# Patient Record
Sex: MALE | Race: WHITE | NOT HISPANIC OR LATINO | Employment: OTHER | URBAN - METROPOLITAN AREA
[De-identification: names, ages, dates, MRNs, and addresses within clinical notes are randomized per-mention and may not be internally consistent; named-entity substitution may affect disease eponyms.]

---

## 2021-08-16 ENCOUNTER — IMMUNIZATION (OUTPATIENT)
Dept: FAMILY MEDICINE | Facility: CLINIC | Age: 71
End: 2021-08-16
Payer: MEDICARE

## 2021-08-16 DIAGNOSIS — Z23 NEED FOR VACCINATION: Primary | ICD-10-CM

## 2021-08-16 PROCEDURE — 91301 COVID-19, MRNA, LNP-S, PF, 100 MCG/0.5 ML DOSE VACCINE: ICD-10-PCS | Mod: S$GLB,,, | Performed by: FAMILY MEDICINE

## 2021-08-16 PROCEDURE — 0013A COVID-19, MRNA, LNP-S, PF, 100 MCG/0.5 ML DOSE VACCINE: ICD-10-PCS | Mod: CV19,S$GLB,, | Performed by: FAMILY MEDICINE

## 2021-08-16 PROCEDURE — 91301 COVID-19, MRNA, LNP-S, PF, 100 MCG/0.5 ML DOSE VACCINE: CPT | Mod: S$GLB,,, | Performed by: FAMILY MEDICINE

## 2021-08-16 PROCEDURE — 0013A COVID-19, MRNA, LNP-S, PF, 100 MCG/0.5 ML DOSE VACCINE: CPT | Mod: CV19,S$GLB,, | Performed by: FAMILY MEDICINE

## 2022-07-25 ENCOUNTER — IMMUNIZATION (OUTPATIENT)
Dept: FAMILY MEDICINE | Facility: CLINIC | Age: 72
End: 2022-07-25
Payer: MEDICARE

## 2022-07-25 DIAGNOSIS — Z23 NEED FOR VACCINATION: Primary | ICD-10-CM

## 2022-07-25 PROCEDURE — 91306 COVID-19, MRNA, LNP-S, PF, 100 MCG/0.25 ML DOSE VACCINE (MODERNA BOOSTER): CPT | Mod: PBBFAC

## 2022-07-25 PROCEDURE — 0064A COVID-19, MRNA, LNP-S, PF, 100 MCG/0.25 ML DOSE VACCINE (MODERNA BOOSTER): CPT | Mod: PBBFAC

## 2022-07-25 NOTE — PROGRESS NOTES
Ricardo Jaime III arrive to clinic awake and alert.  Pt verified name and .   Allergies reviewed with patient.  Pt given MODERNA via Intramuscular Left deltoid per provider orders.    Well tolerated by patient.  denies further needs.    Patient instructed to wait 15 mins after injection.   Patient states no vaccines in the last 14 days.  Vaccine information sheet was given to patient. Patient voiced understanding.    Amara Donovan LPN

## 2023-07-05 ENCOUNTER — OFFICE VISIT (OUTPATIENT)
Dept: OTOLARYNGOLOGY | Facility: CLINIC | Age: 73
End: 2023-07-05
Payer: MEDICARE

## 2023-07-05 VITALS — DIASTOLIC BLOOD PRESSURE: 75 MMHG | WEIGHT: 288.81 LBS | SYSTOLIC BLOOD PRESSURE: 139 MMHG

## 2023-07-05 DIAGNOSIS — C91.10 CLL (CHRONIC LYMPHOCYTIC LEUKEMIA): ICD-10-CM

## 2023-07-05 DIAGNOSIS — J32.9 CHRONIC SINUSITIS, UNSPECIFIED LOCATION: Primary | ICD-10-CM

## 2023-07-05 PROCEDURE — 99999 PR PBB SHADOW E&M-EST. PATIENT-LVL III: CPT | Mod: PBBFAC,,, | Performed by: OTOLARYNGOLOGY

## 2023-07-05 PROCEDURE — 99213 OFFICE O/P EST LOW 20 MIN: CPT | Mod: PBBFAC,PN | Performed by: OTOLARYNGOLOGY

## 2023-07-05 PROCEDURE — 99999 PR PBB SHADOW E&M-EST. PATIENT-LVL III: ICD-10-PCS | Mod: PBBFAC,,, | Performed by: OTOLARYNGOLOGY

## 2023-07-05 PROCEDURE — 99203 PR OFFICE/OUTPT VISIT, NEW, LEVL III, 30-44 MIN: ICD-10-PCS | Mod: S$PBB,,, | Performed by: OTOLARYNGOLOGY

## 2023-07-05 PROCEDURE — 87070 CULTURE OTHR SPECIMN AEROBIC: CPT | Performed by: OTOLARYNGOLOGY

## 2023-07-05 PROCEDURE — 87186 SC STD MICRODIL/AGAR DIL: CPT | Performed by: OTOLARYNGOLOGY

## 2023-07-05 PROCEDURE — 99203 OFFICE O/P NEW LOW 30 MIN: CPT | Mod: S$PBB,,, | Performed by: OTOLARYNGOLOGY

## 2023-07-05 PROCEDURE — 87077 CULTURE AEROBIC IDENTIFY: CPT | Performed by: OTOLARYNGOLOGY

## 2023-07-05 RX ORDER — MUPIROCIN 20 MG/G
OINTMENT TOPICAL 2 TIMES DAILY
COMMUNITY
Start: 2023-06-16

## 2023-07-05 RX ORDER — GABAPENTIN 300 MG/1
300 CAPSULE ORAL 3 TIMES DAILY
COMMUNITY
Start: 2023-06-04

## 2023-07-05 RX ORDER — ONDANSETRON 4 MG/1
4 TABLET, FILM COATED ORAL EVERY 8 HOURS PRN
COMMUNITY
Start: 2023-06-16

## 2023-07-05 RX ORDER — TESTOSTERONE CYPIONATE 200 MG/ML
150 INJECTION, SOLUTION INTRAMUSCULAR
COMMUNITY
Start: 2023-05-21

## 2023-07-05 RX ORDER — SACUBITRIL AND VALSARTAN 49; 51 MG/1; MG/1
1 TABLET, FILM COATED ORAL 2 TIMES DAILY
COMMUNITY
Start: 2023-04-07

## 2023-07-05 RX ORDER — ALLOPURINOL 300 MG/1
TABLET ORAL
COMMUNITY
Start: 2022-07-15

## 2023-07-05 RX ORDER — BRIMONIDINE TARTRATE 2 MG/ML
SOLUTION/ DROPS OPHTHALMIC
COMMUNITY
Start: 2023-05-21

## 2023-07-05 RX ORDER — ESCITALOPRAM OXALATE 10 MG/1
10 TABLET ORAL
COMMUNITY
Start: 2023-05-26 | End: 2023-08-24

## 2023-07-05 RX ORDER — FAMOTIDINE 20 MG/1
20 TABLET, FILM COATED ORAL 2 TIMES DAILY
COMMUNITY

## 2023-07-05 RX ORDER — TIMOLOL MALEATE 5 MG/ML
1 SOLUTION/ DROPS OPHTHALMIC 2 TIMES DAILY
COMMUNITY
Start: 2023-05-03

## 2023-07-05 RX ORDER — SPIRONOLACTONE 25 MG/1
25 TABLET ORAL
COMMUNITY
Start: 2023-06-13 | End: 2024-06-07

## 2023-07-05 RX ORDER — LATANOPROST 50 UG/ML
1 SOLUTION/ DROPS OPHTHALMIC
COMMUNITY
Start: 2023-05-03

## 2023-07-05 RX ORDER — METOPROLOL SUCCINATE 25 MG/1
25 TABLET, EXTENDED RELEASE ORAL
COMMUNITY
Start: 2023-06-13

## 2023-07-05 RX ORDER — OMEPRAZOLE 40 MG/1
40 CAPSULE, DELAYED RELEASE ORAL
COMMUNITY
Start: 2023-06-05

## 2023-07-05 RX ORDER — TAMSULOSIN HYDROCHLORIDE 0.4 MG/1
1 CAPSULE ORAL
COMMUNITY
Start: 2023-06-28

## 2023-07-05 RX ORDER — FUROSEMIDE 10 MG/ML
SOLUTION ORAL
COMMUNITY

## 2023-07-05 RX ORDER — ACALABRUTINIB 100 MG/1
1 TABLET, FILM COATED ORAL EVERY 12 HOURS
COMMUNITY
Start: 2023-06-16

## 2023-07-05 NOTE — PROGRESS NOTES
Subjective:       Patient ID: Ricardo Jaime III is a 73 y.o. male.    Chief Complaint: Sinusitis (Pt c/o sinusitis for many years. )      This 73-year-old gentleman who has a diagnosis of CLL currently undergoing treatment including IVIG and appropriate chemotherapies has been on rituximab but a change has been made but his most recent regimen.      He comes in because he has a history of chronic sinus infections he has been treated by Dr. Rascon in [place] had multiple surgeries has multiple chronic infections at one point had a month or so of IV antibiotics through his [facility] and just comes in to reestablish care a bit closer to home he lives in way of [address] and is in his usual condition which includes nasal congestion blowing crusty material out frequently.  He is not allergic to any categories of antibiotic he has irrigated with mupirocin mixed into his irrigations but he does not find that helpful.  He uses NeilMed bottles and equate buffer packets.    Sinusitis        Objective:      ENT Physical Exam  Constitutional  Appearance: patient appears well-developed, well-nourished and well-groomed,  Communication/Voice: communication appropriate for developmental age; vocal quality normal;  Head and Face  Appearance: head appears normal, face appears normal and face appears atraumatic;  Salivary: glands normal;  Ear  Hearing: intact;  Auricles: right auricle normal; left auricle normal;  Ear Canals: right ear canal normal; left ear canal normal;  Tympanic Membranes: right tympanic membrane normal; left tympanic membrane normal;  Nose  External Nose: nares patent bilaterally; external nose normal;  Internal Nose: nasal mucosa normal; bilateral inferior turbinates normal;  Nose comments: His nasal exam is remarkable for an obvious postop condition of a very straight septum there is a small perforation but it is posterior and that is small and not subject to crusting or problems, he has crusting adherent to  the lateral nasal wall bilaterally but not a large amount, I did not scope him today but perhaps should after deciding whether to treat culture positive findings which I think are very likely.  Historically he has had staph grow out which would not surprise me.  Oral Cavity/Oropharynx  Lips: normal;  Teeth: normal;  Gums: gingiva normal;  Tongue: normal;  Oral mucosa: normal;  Hard palate: normal;  Soft palate: normal;  Tonsils: normal;  Base of Tongue: normal;  Posterior pharyngeal wall: normal;  Neck  Neck: neck normal; neck palpation normal;  Thyroid: thyroid normal;  Lymphatic  Palpation: lymph nodes normal;        Assessment:       1. Chronic sinusitis, unspecified location    2. CLL (chronic lymphocytic leukemia)         Plan:          So I obtained a culture and we are going to discuss on the phone early next week when it becomes available treatment options assuming it is staph I think it makes sense to try to be to back with maybe a month of Bactrim and I have discussed other irrigation methods since he does have physical crusting that bothers him such as the Navage device that he may find a worthwhile investment.    At least 30 minutes were spent on this patient including records review, time spent with patient, charting, ordering medications, and completing documentation.     I have him scheduled to see me back in six weeks but we will be talking on the phone and probably treating positive cultures and that is probably a role to scope him on our next visit after treatment.

## 2023-07-08 LAB — BACTERIA SPEC AEROBE CULT: ABNORMAL

## 2023-07-10 ENCOUNTER — TELEPHONE (OUTPATIENT)
Dept: OTOLARYNGOLOGY | Facility: CLINIC | Age: 73
End: 2023-07-10
Payer: MEDICARE

## 2023-07-10 RX ORDER — CIPROFLOXACIN 500 MG/1
500 TABLET ORAL 2 TIMES DAILY
Qty: 40 TABLET | Refills: 0 | Status: SHIPPED | OUTPATIENT
Start: 2023-07-10 | End: 2023-07-30

## 2023-07-10 NOTE — TELEPHONE ENCOUNTER
----- Message from Salvatore Day MD sent at 7/10/2023 10:13 AM CDT -----  Tell the patient that the culture was finished over the weekend and shows Pseudomonas bacteria that should respond to Cipro, and so I sent in a few weeks and see if that will settle things down a little bit.

## 2023-07-10 NOTE — PROGRESS NOTES
Tell the patient that the culture was finished over the weekend and shows Pseudomonas bacteria that should respond to Cipro, and so I sent in a few weeks and see if that will settle things down a little bit.

## 2023-12-28 DIAGNOSIS — M25.552 LEFT HIP PAIN: Primary | ICD-10-CM

## 2024-01-04 ENCOUNTER — CLINICAL SUPPORT (OUTPATIENT)
Dept: REHABILITATION | Facility: HOSPITAL | Age: 74
End: 2024-01-04
Payer: MEDICARE

## 2024-01-04 DIAGNOSIS — M25.552 LEFT HIP PAIN: ICD-10-CM

## 2024-01-04 DIAGNOSIS — M25.552 ACUTE HIP PAIN, LEFT: Primary | ICD-10-CM

## 2024-01-04 PROCEDURE — 97110 THERAPEUTIC EXERCISES: CPT

## 2024-01-04 PROCEDURE — 97161 PT EVAL LOW COMPLEX 20 MIN: CPT

## 2024-01-04 NOTE — PLAN OF CARE
Physical Therapy Initial Evaluation     Name: Ricardo Jaime III  Clinic Number: [id number]    Diagnosis:   Encounter Diagnoses   Name Primary?    Left hip pain     Acute hip pain, left Yes     Physician: Taina Eaton MD  Treatment Orders: PT Eval and Treat  Past Medical History:   Diagnosis Date    Cancer     all-cll     Current Outpatient Medications   Medication Sig    allopurinoL (ZYLOPRIM) 300 MG tablet   = 1 tab, Oral, Daily, # 90 tab, 2 Refill(s), Maintenance, Pharmacy: [id number], 180, cm, 23 12:42:00 CDT, Height/Length Measured, 128, kg, 23 11:29:00 CST, Weight Dosing    brimonidine 0.2% (ALPHAGAN) 0.2 % Drop SMARTSI Drop(s) Left Eye Every 12 Hours    CALQUENCE, ACALABRUTINIB MAL, 100 mg Tab Take 1 tablet by mouth every 12 (twelve) hours.    ENTRESTO 49-51 mg per tablet Take 1 tablet by mouth 2 (two) times daily.    EScitalopram oxalate (LEXAPRO) 10 MG tablet 10 mg.    famotidine (PEPCID) 20 MG tablet Take 20 mg by mouth 2 (two) times daily.    furosemide (LASIX) 10 mg/mL (alcohol free) solution Take by mouth.    gabapentin (NEURONTIN) 300 MG capsule Take 300 mg by mouth 3 (three) times daily.    latanoprost 0.005 % ophthalmic solution 1 drop.    metoprolol succinate (TOPROL-XL) 25 MG 24 hr tablet Take 25 mg by mouth.    mupirocin (BACTROBAN) 2 % ointment Apply topically 2 (two) times daily.    omeprazole (PRILOSEC) 40 MG capsule Take 40 mg by mouth.    ondansetron (ZOFRAN) 4 MG tablet Take 4 mg by mouth every 8 (eight) hours as needed.    spironolactone (ALDACTONE) 25 MG tablet 25 mg.    tamsulosin (FLOMAX) 0.4 mg Cap Take 1 capsule by mouth.    testosterone cypionate (DEPOTESTOTERONE CYPIONATE) 200 mg/mL injection Inject 150 mg into the muscle every 30 days.    timolol maleate 0.5% (TIMOPTIC) 0.5 % Drop Place 1 drop into the left eye 2 (two) times daily.     No current facility-administered medications for this visit.      Review of patient's allergies indicates:   Allergen Reactions    Ace inhibitors      Other reaction(s): Cough    Aspirin Nausea Only       SUBJECTIVE     Patient states:  I was doing yard work and felt pain in my hip and just kept going  Pts goals:  get rid of pain  Pain Scale: Ricardo rates pain on a scale of 0-10 to be 8 at worst; 5 currently; 3 at best .  Onset: sudden  Chief complaint:  pain  Radicular symptoms:  no  Aggravating factors:   walking  Easing factors:  rest  Precautions: 0  Prior Therapy: none  History of Present Illness: see above  Home Environment (Steps/Adaptations): 0  Functional Deficits Leading to Referral: walking  Prior functional status: independent  Current functional status:  being mobile  DME owned: walking stick  Occupation:  retired                       Job description includes:  retired      OBJECTIVE                LOWER EXTREMITY -- AROM/PROM  (R) LE: WNLs  (L) LE: WNLs            Lower Extremity Strength  Right LE  Left LE    Hip Flexion: 4+/5 Hip Flexion: 4+/5   Hip Abduction: 5/5 Hip Abduction: 5/5   Hip IR: 5/5 Hip IR: 5/5   Hip ER: 5/5 Hip ER: 5/5   Hip Extension: 5/5 Hip Extension: 5/5   Hip flexion: 5/5 Hip flexion: 5/5   Knee extension: 5/5 Knee extension: 5/5   Knee flexion: 5/5 Knee flexion: 5/5   Ankle dorsiflexion:   5/5 Ankle dorsiflexion:   5/5   Ankle plantarflexion: 5/5 Ankle plantarflexion: 5/5     GAIT: Ricardo ambulates 150 feet with SPC with independently.     GAIT DEVIATIONS: Ricardo displays unsteady gait    Pt/family was provided educational information, including: role of PT, goals for PT, scheduling - pt verbalized understanding. Discussed insurance limitations with pt.     Exercises were reviewed and pt was able to demonstrate them prior to the end of the session. Pt received a written copy of exercises to perform at home.  Pt has no cultural, educational or language barriers to learning provided.    TREATMENT     Time In: 10:15  Time Out: 11:00    PT  Evaluation Completed? Yes  Discussed Plan of Care with patient: Yes    Ricardo received 25 minutes of therapeutic exercises.    Written Home Exercises Provided: standing marches, hip abd, hip ext  Ricardo demo good understanding of the education provided. Patient demo good return demo of skill of exercises.        ASSESSMENT   Pt prognosis is Good.  Pt will benefit from skilled outpatient physical therapy to address the above stated deficits, provide pt/family education and to maximize pt's level of independence.     Medical necessity is demonstrated by the following IMPAIRMENTS/PROBLEMS:  1. Pain with activity  2. Increased disability score        Pt's spiritual, cultural and educational needs considered and pt agreeable to plan of care and goals as stated below:     Anticipated Barriers for physical therapy:     Short Term GOALS: 4 weeks. Pt agrees with goals set.  Patient will be I with HEP  Patient will ambulate community distances with no pain  Functional Limitations Reports - G Codes  Category: mobility  Tool: LEFS  Score: 40/80    TEST SCORE  Modifier  Impairment Limitation Restriction    80/80  CH  0 % impaired, limited or restricted   64-79/80  CI  @ least 1% but less than 20% impaired, limited or restricted   49-63/80  CJ  @ least 20%<40% impaired, limited or restricted   33-48/80  CK  @ least 40%<60% impaired, limited or restricted   17-32/80  CL  @ least 60% <80% impaired, limited or restricted   1-16/80  CM  @ least 80%<100% impaired limited or restricted   0/80  CN  100% impaired, limited or restricted           PLAN     Outpatient physical therapy 2 times weekly to include: pt ed, hep, therapeutic exercises, neuromuscular re-education/ balance exercises, joint mobilizations, aquatic therapy and modalities prn. Cont PT for  4 weeks. Pt may be seen by PTA as part of the rehabilitation team.       Therapist: Singh Rosenberg, PT      I certify the need for these services furnished under this plan of treatment  and while under my care.  ____________________________________ Physician/Referring Practitioner   Date of Signature

## 2024-01-09 ENCOUNTER — CLINICAL SUPPORT (OUTPATIENT)
Dept: REHABILITATION | Facility: HOSPITAL | Age: 74
End: 2024-01-09
Payer: MEDICARE

## 2024-01-09 DIAGNOSIS — M25.552 ACUTE HIP PAIN, LEFT: Primary | ICD-10-CM

## 2024-01-09 PROCEDURE — 97110 THERAPEUTIC EXERCISES: CPT

## 2024-01-09 NOTE — PROGRESS NOTES
Physical Therapy Daily Note     Name: Ricardo Jaime III  Clinic Number: [id number]  Diagnosis:   Encounter Diagnosis   Name Primary?    Acute hip pain, left Yes     Physician: Taina Eaton MD    Onset date of diagnosis:  12/28/2023  PT eval date:  1/4/2024  Ins authorization period:  1/1/2024 - 12/1/2024  Plan of care expires:  2/14/2024  Visit date: 1/9/2024  VISIT #: 2 of 8  Time in: 8:00  Time out: 8:45  Precautions:  0      Subjective     Pt reports: the pain in my back is gone, hip is still a little sore  Pain Scale: Ricardo rates pain on a scale of 0-10 to be 4 currently.    Objective     Ricardo received individual therapeutic exercises to develop strength, endurance, ROM, and flexibility to left hip for 45 minutes including:  Nu-step   x 10 mins  DKTC on SB   x 20  Bridges on SB  x 20  Trans abd   x 20  PPT    x 20  Crunch   x 20  L SLR    x 20  R SL clams   x 20  R SL hip abd   x 20  LAQ    x 20  Knee flex   x 20  Step-ups   x 20  Blue pad   x 20  Side-step   x 20    Core exercises for SI malalignment on initial visit      Written Home Exercises Provided: standing marches, hip abd, hip ext  Pt demo good understanding of the education provided. Ricardo demonstrated good return demonstration of activities.     Education provided re:  Ricardo verbalized good understanding of education provided.   No spiritual or educational barriers to learning provided    Assessment     Patient tolerated treatment well with no adverse affects  This is a 73 y.o. male referred to outpatient physical therapy and presents with a medical diagnosis of left hip pain and demonstrates limitations as described in the problem list. Pt prognosis is Good. Pt will continue to benefit from skilled outpatient physical therapy to address the deficits listed in the problem list, provide pt/family education and to maximize pt's level of independence in the home and community environment.      Goals as follows:  Short Term GOALS: 4 weeks. Pt agrees with goals set.  Patient will be I with HEP  Patient will ambulate community distances with no pain     Plan     Continue with established Plan of Care towards PT goals.    Therapist: Singh Rosenberg, PT  1/9/2024

## 2024-01-26 ENCOUNTER — CLINICAL SUPPORT (OUTPATIENT)
Dept: REHABILITATION | Facility: HOSPITAL | Age: 74
End: 2024-01-26
Payer: MEDICARE

## 2024-01-26 DIAGNOSIS — M25.552 ACUTE HIP PAIN, LEFT: Primary | ICD-10-CM

## 2024-01-26 PROCEDURE — 97110 THERAPEUTIC EXERCISES: CPT

## 2024-01-26 NOTE — PROGRESS NOTES
Physical Therapy Daily Note     Name: Ricardo Jaime III  Clinic Number: [id number]  Diagnosis:   Encounter Diagnosis   Name Primary?    Acute hip pain, left Yes     Physician: Taina Eaton MD    Onset date of diagnosis:  12/28/2023  PT eval date:  1/4/2024  Ins authorization period:  1/1/2024 - 12/1/2024  Plan of care expires:  2/14/2024  Visit date: 1/26/2024  VISIT #: 3 of 8  Time in: 11:00  Time out: 11:45  Precautions:  0      Subjective     Pt reports: I'm doing ok today  Pain Scale: Ricardo rates pain on a scale of 0-10 to be 4 currently.    Objective     Ricardo received individual therapeutic exercises to develop strength, endurance, ROM, and flexibility to left hip for 45 minutes including:  Nu-step   x 10 mins  DKTC on SB   x 20  Bridges on SB  x 20  Trans abd   x 20  PPT    x 20  Crunch   x 20  L SLR    x 20  R SL clams   x 20  R SL hip abd   x 20  LAQ    x 20  Knee flex   x 20  Step-ups   x 20  Blue pad   x 20  Side-step   x 20    Core exercises for SI malalignment on initial visit      Written Home Exercises Provided: standing marches, hip abd, hip ext  Pt demo good understanding of the education provided. Ricardo demonstrated good return demonstration of activities.     Education provided re:  Ricardo verbalized good understanding of education provided.   No spiritual or educational barriers to learning provided    Assessment     Patient tolerated treatment well with no adverse affects  This is a 74 y.o. male referred to outpatient physical therapy and presents with a medical diagnosis of left hip pain and demonstrates limitations as described in the problem list. Pt prognosis is Good. Pt will continue to benefit from skilled outpatient physical therapy to address the deficits listed in the problem list, provide pt/family education and to maximize pt's level of independence in the home and community environment.     Goals as follows:  Short Term  GOALS: 4 weeks. Pt agrees with goals set.  Patient will be I with HEP  Patient will ambulate community distances with no pain     Plan     Continue with established Plan of Care towards PT goals.  6 scheduled visits remaining - January 29, February 2, 5, 7, 12, 14    Therapist: Singh Rosenberg, PT  1/26/2024

## 2024-01-29 ENCOUNTER — CLINICAL SUPPORT (OUTPATIENT)
Dept: REHABILITATION | Facility: HOSPITAL | Age: 74
End: 2024-01-29
Payer: MEDICARE

## 2024-01-29 DIAGNOSIS — M25.552 ACUTE HIP PAIN, LEFT: Primary | ICD-10-CM

## 2024-01-29 PROCEDURE — 97110 THERAPEUTIC EXERCISES: CPT

## 2024-01-29 NOTE — PROGRESS NOTES
Physical Therapy Daily Note     Name: Ricardo Jaime III  Clinic Number: [id number]  Diagnosis:   Encounter Diagnosis   Name Primary?    Acute hip pain, left Yes     Physician: Taina Eaton MD    Onset date of diagnosis:  12/28/2023  PT eval date:  1/4/2024  Ins authorization period:  1/1/2024 - 12/1/2024  Plan of care expires:  2/14/2024  Visit date: 1/29/2024  VISIT #: 4 of 8  Time in: 1:15  Time out: 2:00  Precautions:  0      Subjective     Pt reports: I'm doing ok today  Pain Scale: Ricardo rates pain on a scale of 0-10 to be 4 currently.    Objective     Ricardo received individual therapeutic exercises to develop strength, endurance, ROM, and flexibility to left hip for 45 minutes including:  Nu-step   x 10 mins  DKTC on SB   x 20  Bridges on SB  x 20  Trans abd   x 20  PPT    x 20  Crunch   x 20  L SLR    x 20  R SL clams   x 20  R SL hip abd   x 20  LAQ    x 20  Knee flex   x 20  Step-ups   x 20  Blue pad   x 20  Side-step   x 20    Core exercises for SI malalignment on initial visit      Written Home Exercises Provided: standing marches, hip abd, hip ext  Pt demo good understanding of the education provided. Ricardo demonstrated good return demonstration of activities.     Education provided re:  Ricardo verbalized good understanding of education provided.   No spiritual or educational barriers to learning provided    Assessment     Patient tolerated treatment well with no adverse affects  This is a 74 y.o. male referred to outpatient physical therapy and presents with a medical diagnosis of left hip pain and demonstrates limitations as described in the problem list. Pt prognosis is Good. Pt will continue to benefit from skilled outpatient physical therapy to address the deficits listed in the problem list, provide pt/family education and to maximize pt's level of independence in the home and community environment.     Goals as follows:  Short Term GOALS:  4 weeks. Pt agrees with goals set.  Patient will be I with HEP  Patient will ambulate community distances with no pain     Plan     Continue with established Plan of Care towards PT goals.  5 scheduled visits remaining - February 2, 5, 7, 12, 14    Therapist: Singh Rosenberg, PT  1/29/2024

## 2024-02-02 ENCOUNTER — CLINICAL SUPPORT (OUTPATIENT)
Dept: REHABILITATION | Facility: HOSPITAL | Age: 74
End: 2024-02-02
Payer: MEDICARE

## 2024-02-02 DIAGNOSIS — M25.552 ACUTE HIP PAIN, LEFT: Primary | ICD-10-CM

## 2024-02-02 PROCEDURE — 97110 THERAPEUTIC EXERCISES: CPT

## 2024-02-02 NOTE — PROGRESS NOTES
Physical Therapy Daily Note     Name: Ricardo Jaime III  Clinic Number: [id number]  Diagnosis:   Encounter Diagnosis   Name Primary?    Acute hip pain, left Yes     Physician: Taina Eaton MD    Onset date of diagnosis:  12/28/2023  PT eval date:  1/4/2024  Ins authorization period:  1/1/2024 - 12/1/2024  Plan of care expires:  2/14/2024  Visit date: 1/29/2024  VISIT #: 5 of 8  Time in: 8:00  Time out: 8:45  Precautions:  0      Subjective     Pt reports: I'm doing ok today  Pain Scale: Ricardo rates pain on a scale of 0-10 to be 4 currently.    Objective     Ricardo received individual therapeutic exercises to develop strength, endurance, ROM, and flexibility to left hip for 45 minutes including:  Nu-step   x 10 mins  DKTC on SB   x 20  Bridges on SB  x 20  Trans abd   x 20  PPT    x 20  Crunch   x 20  L SLR    x 20  R SL clams   x 20  R SL hip abd   x 20  LAQ    x 20  Knee flex   x 20  Step-ups   x 20  Blue pad   x 20  Side-step   x 20    Core exercises for SI malalignment on initial visit      Written Home Exercises Provided: standing marches, hip abd, hip ext  Pt demo good understanding of the education provided. Ricardo demonstrated good return demonstration of activities.     Education provided re:  Ricardo verbalized good understanding of education provided.   No spiritual or educational barriers to learning provided    Assessment     Patient tolerated treatment well with no adverse affects  This is a 74 y.o. male referred to outpatient physical therapy and presents with a medical diagnosis of left hip pain and demonstrates limitations as described in the problem list. Pt prognosis is Good. Pt will continue to benefit from skilled outpatient physical therapy to address the deficits listed in the problem list, provide pt/family education and to maximize pt's level of independence in the home and community environment.     Goals as follows:  Short Term GOALS:  4 weeks. Pt agrees with goals set.  Patient will be I with HEP  Patient will ambulate community distances with no pain     Plan     Continue with established Plan of Care towards PT goals.  4 scheduled visits remaining - February 5, 7, 12, 14    Therapist: Singh Rosenberg, PT  2/2/2024

## 2024-02-05 ENCOUNTER — CLINICAL SUPPORT (OUTPATIENT)
Dept: REHABILITATION | Facility: HOSPITAL | Age: 74
End: 2024-02-05
Payer: MEDICARE

## 2024-02-05 DIAGNOSIS — M25.552 ACUTE HIP PAIN, LEFT: Primary | ICD-10-CM

## 2024-02-05 PROCEDURE — 97110 THERAPEUTIC EXERCISES: CPT

## 2024-02-07 ENCOUNTER — CLINICAL SUPPORT (OUTPATIENT)
Dept: REHABILITATION | Facility: HOSPITAL | Age: 74
End: 2024-02-07
Payer: MEDICARE

## 2024-02-07 DIAGNOSIS — M25.552 ACUTE HIP PAIN, LEFT: Primary | ICD-10-CM

## 2024-02-07 PROCEDURE — 97110 THERAPEUTIC EXERCISES: CPT

## 2024-02-07 NOTE — PROGRESS NOTES
Physical Therapy Daily Note     Name: Ricardo Jaime III  Clinic Number: [id number]  Diagnosis:   Encounter Diagnosis   Name Primary?    Acute hip pain, left Yes     Physician: Taina Eaton MD    Onset date of diagnosis:  12/28/2023  PT eval date:  1/4/2024  Ins authorization period:  1/1/2024 - 12/1/2024  Plan of care expires:  2/14/2024  Visit date: 2/7/2024  VISIT #: 7 of 8  Time in: 8:00  Time out: 8:45  Precautions:  0      Subjective     Pt reports: my lower back is hurting this morning.  Pain Scale: Ricardo rates pain on a scale of 0-10 to be 4 currently.    Objective     Ricardo received individual therapeutic exercises to develop strength, endurance, ROM, and flexibility to left hip for 45 minutes including:  Nu-step   x 10 mins  DKTC on SB   x 20  Bridges on SB  x 20  Trans abd   x 20  PPT    x 20  Crunch   x 20  L SLR    x 20  R SL clams   x 20  R SL hip abd   x 20  LAQ    x 20  Knee flex   x 20 GTB  Step-ups   x 20  Blue pad   x 20  Side-step   x 3    Core exercises for SI malalignment on initial visit      Written Home Exercises Provided: standing marches, hip abd, hip ext  Pt demo good understanding of the education provided. Ricardo demonstrated good return demonstration of activities.     Education provided re:  Ricardo verbalized good understanding of education provided.   No spiritual or educational barriers to learning provided    Assessment     Patient tolerated treatment well with no adverse affects  This is a 74 y.o. male referred to outpatient physical therapy and presents with a medical diagnosis of left hip pain and demonstrates limitations as described in the problem list. Pt prognosis is Good. Pt will continue to benefit from skilled outpatient physical therapy to address the deficits listed in the problem list, provide pt/family education and to maximize pt's level of independence in the home and community environment.     Goals as  follows:  Short Term GOALS: 4 weeks. Pt agrees with goals set.  Patient will be I with HEP  Patient will ambulate community distances with no pain     Plan     Continue with established Plan of Care towards PT goals.  2 scheduled visits remaining - February 12, 14    Therapist: Singh Rosenberg, PT  2/7/2024

## 2024-02-16 ENCOUNTER — CLINICAL SUPPORT (OUTPATIENT)
Dept: REHABILITATION | Facility: HOSPITAL | Age: 74
End: 2024-02-16
Payer: MEDICARE

## 2024-02-16 DIAGNOSIS — M25.552 ACUTE HIP PAIN, LEFT: Primary | ICD-10-CM

## 2024-02-16 PROCEDURE — 97110 THERAPEUTIC EXERCISES: CPT

## 2024-02-16 NOTE — PROGRESS NOTES
Physical Therapy Daily Note     Name: Ricardo Jaime III  Clinic Number: [id number]  Diagnosis:   Encounter Diagnosis   Name Primary?    Acute hip pain, left Yes     Physician: Taina Eaton MD    Onset date of diagnosis:  12/28/2023  PT eval date:  1/4/2024  Ins authorization period:  1/1/2024 - 12/1/2024  Plan of care expires:  2/14/2024  Visit date: 2/16/2024  VISIT #: 8 of 8  Time in: 8:00  Time out: 8:45  Precautions:  0      Subjective     Pt reports: I'm doing ok today  Pain Scale: Ricardo rates pain on a scale of 0-10 to be 4 currently.    Objective     Ricardo received individual therapeutic exercises to develop strength, endurance, ROM, and flexibility to left hip for 45 minutes including:  Nu-step   x 10 mins  DKTC on SB   x 20  Bridges on SB  x 20  Trans abd   x 20  PPT    x 20  Crunch   x 20  L SLR    x 20  R SL clams   x 20  R SL hip abd   x 20  LAQ    x 20  Knee flex   x 20 GTB  Step-ups   x 20  Blue pad   x 20  Side-step   x 3    Core exercises for SI malalignment on initial visit      Written Home Exercises Provided: standing marches, hip abd, hip ext  Pt demo good understanding of the education provided. Ricardo demonstrated good return demonstration of activities.     Education provided re:  Ricardo verbalized good understanding of education provided.   No spiritual or educational barriers to learning provided    Assessment     Patient tolerated treatment well with no adverse affects  This is a 74 y.o. male referred to outpatient physical therapy and presents with a medical diagnosis of left hip pain and demonstrates limitations as described in the problem list. Pt prognosis is Good. Pt will continue to benefit from skilled outpatient physical therapy to address the deficits listed in the problem list, provide pt/family education and to maximize pt's level of independence in the home and community environment.     Goals as follows:  Short Term  GOALS: 4 weeks. Pt agrees with goals set.  Patient will be I with HEP  Patient will ambulate community distances with no pain     Plan     Discharge from PT, goals met, I with HEP, patient verbalizes understanding.    Therapist: Singh Rosenberg, PT  2/16/2024

## 2024-06-27 ENCOUNTER — OFFICE VISIT (OUTPATIENT)
Dept: OTOLARYNGOLOGY | Facility: CLINIC | Age: 74
End: 2024-06-27
Payer: MEDICARE

## 2024-06-27 VITALS — HEIGHT: 71 IN | BODY MASS INDEX: 37.56 KG/M2 | WEIGHT: 268.31 LBS

## 2024-06-27 DIAGNOSIS — J32.9 CHRONIC SINUSITIS, UNSPECIFIED LOCATION: Primary | ICD-10-CM

## 2024-06-27 DIAGNOSIS — C91.10 CLL (CHRONIC LYMPHOCYTIC LEUKEMIA): ICD-10-CM

## 2024-06-27 PROCEDURE — 99213 OFFICE O/P EST LOW 20 MIN: CPT | Mod: PBBFAC,PN | Performed by: OTOLARYNGOLOGY

## 2024-06-27 PROCEDURE — 87186 SC STD MICRODIL/AGAR DIL: CPT | Performed by: OTOLARYNGOLOGY

## 2024-06-27 PROCEDURE — 87070 CULTURE OTHR SPECIMN AEROBIC: CPT | Performed by: OTOLARYNGOLOGY

## 2024-06-27 PROCEDURE — 99214 OFFICE O/P EST MOD 30 MIN: CPT | Mod: S$PBB,,, | Performed by: OTOLARYNGOLOGY

## 2024-06-27 PROCEDURE — 99999 PR PBB SHADOW E&M-EST. PATIENT-LVL III: CPT | Mod: PBBFAC,,, | Performed by: OTOLARYNGOLOGY

## 2024-06-27 PROCEDURE — 87077 CULTURE AEROBIC IDENTIFY: CPT | Performed by: OTOLARYNGOLOGY

## 2024-06-27 RX ORDER — CIPROFLOXACIN 500 MG/1
500 TABLET ORAL 2 TIMES DAILY
Qty: 28 TABLET | Refills: 0 | Status: SHIPPED | OUTPATIENT
Start: 2024-06-27 | End: 2024-07-17

## 2024-06-27 NOTE — PROGRESS NOTES
Subjective:       Patient ID: Ricardo Jaime III is a 74 y.o. male.    Chief Complaint: Sinusitis (Pt c/o green mucus, head pressure, and coughing. )      This gentleman returns having done pretty well until about a month ago when his material he blows out of his nose became more copious his cough became a bit more problematic he is known to have had multiple sinus surgeries Pseudomonas was grown out of his sinuses during our visit last year he tolerate Cipro pretty well he is under continued treatment for CLL having been diagnose 14 years ago currently on an oral agent and immunoglobulin treatments.          Objective:      ENT Physical Exam    He has fairly obvious purulence on the surfaces of his nasal cavity both the septum in the lateral nasal wall bilaterally.  A culture was obtained from the right side.        Assessment:       1. Chronic sinusitis, unspecified location    2. CLL (chronic lymphocytic leukemia)         Plan:          So given his previous cultures of Pseudomonas and seeing some large amounts of green material like just started him back on Cipro what he tolerated recently and repeated a culture to be sure we are not dealing with different or additional bacteria and I am going to contact him next week with the culture results and make changes to the antibiotics if appropriate

## 2024-06-29 LAB — BACTERIA SPEC AEROBE CULT: ABNORMAL

## 2024-07-02 ENCOUNTER — PATIENT MESSAGE (OUTPATIENT)
Dept: OTOLARYNGOLOGY | Facility: CLINIC | Age: 74
End: 2024-07-02
Payer: MEDICARE

## 2024-08-22 ENCOUNTER — OFFICE VISIT (OUTPATIENT)
Dept: OTOLARYNGOLOGY | Facility: CLINIC | Age: 74
End: 2024-08-22
Payer: MEDICARE

## 2024-08-22 VITALS — BODY MASS INDEX: 38.92 KG/M2 | WEIGHT: 278 LBS | HEIGHT: 71 IN

## 2024-08-22 DIAGNOSIS — J32.9 CHRONIC SINUSITIS, UNSPECIFIED LOCATION: Primary | ICD-10-CM

## 2024-08-22 DIAGNOSIS — C91.10 CLL (CHRONIC LYMPHOCYTIC LEUKEMIA): ICD-10-CM

## 2024-08-22 DIAGNOSIS — R42 DIZZINESS: ICD-10-CM

## 2024-08-22 PROCEDURE — 99214 OFFICE O/P EST MOD 30 MIN: CPT | Mod: S$PBB,,, | Performed by: OTOLARYNGOLOGY

## 2024-08-22 PROCEDURE — 99999 PR PBB SHADOW E&M-EST. PATIENT-LVL III: CPT | Mod: PBBFAC,,, | Performed by: OTOLARYNGOLOGY

## 2024-08-22 PROCEDURE — 99213 OFFICE O/P EST LOW 20 MIN: CPT | Mod: PBBFAC,PN | Performed by: OTOLARYNGOLOGY

## 2024-08-22 RX ORDER — CIPROFLOXACIN 500 MG/1
500 TABLET ORAL 2 TIMES DAILY
Qty: 20 TABLET | Refills: 0 | Status: SHIPPED | OUTPATIENT
Start: 2024-08-22 | End: 2024-09-01

## 2024-08-22 RX ORDER — MECLIZINE HYDROCHLORIDE 25 MG/1
25 TABLET ORAL 3 TIMES DAILY PRN
Qty: 25 TABLET | Refills: 0 | Status: SHIPPED | OUTPATIENT
Start: 2024-08-22

## 2024-08-22 NOTE — PROGRESS NOTES
Subjective:       Patient ID: Ricardo Jaime III is a 74 y.o. male.    Chief Complaint: Follow-up (Pt is here to follow up on chronic sinusitis and pt c/o vertigo )      This gentleman presents for me to check a few things he tells me that is Sunday night which was five days ago his wife was very sick diagnose with COVID he has tested negative but he has a mask a     To some extent him keep him my distance because he has been in close contact with someone sick with the COVID    He tells me that he has has a history of vertigo he felt a little bit like that was developing Sunday but it really has not he maybe has a little bit of a feeling when he closes eyes and lays his head back and he is more worried that this will develop than actively having vertigo or dizziness     Additionally we treated him with Pseudomonas positive nasal infection recently and he got much better yet a nose full of pus when I saw him in June          Objective:      ENT Physical Exam    So his nose looks fine I do not see any purulence although he tells me he is blowing some stuff with his immunocompromised condition, he is undergoing treatment for CLL, I think we probably should treat it with the another 10 days even though things look much better in addition I sent that is a meclizine for the dizziness that is nonspecific he is not having active vertigo and I do not know of any way to prevent positional vertigo from developing and I do not think he should do Epley maneuvers in the absence of ongoing positional vertigo        Assessment:       No diagnosis found.     Plan:

## 2025-06-03 ENCOUNTER — OFFICE VISIT (OUTPATIENT)
Dept: OTOLARYNGOLOGY | Facility: CLINIC | Age: 75
End: 2025-06-03
Payer: MEDICARE

## 2025-06-03 VITALS — BODY MASS INDEX: 38.92 KG/M2 | WEIGHT: 278 LBS | HEIGHT: 71 IN

## 2025-06-03 DIAGNOSIS — J32.9 CHRONIC SINUSITIS, UNSPECIFIED LOCATION: Primary | ICD-10-CM

## 2025-06-03 PROCEDURE — 87070 CULTURE OTHR SPECIMN AEROBIC: CPT | Performed by: OTOLARYNGOLOGY

## 2025-06-03 PROCEDURE — 99214 OFFICE O/P EST MOD 30 MIN: CPT | Mod: S$PBB,,, | Performed by: OTOLARYNGOLOGY

## 2025-06-03 PROCEDURE — 99213 OFFICE O/P EST LOW 20 MIN: CPT | Mod: PBBFAC,PN | Performed by: OTOLARYNGOLOGY

## 2025-06-03 PROCEDURE — 99999 PR PBB SHADOW E&M-EST. PATIENT-LVL III: CPT | Mod: PBBFAC,,, | Performed by: OTOLARYNGOLOGY

## 2025-06-03 RX ORDER — CIPROFLOXACIN 500 MG/1
500 TABLET, FILM COATED ORAL 2 TIMES DAILY
Qty: 28 TABLET | Refills: 0 | Status: SHIPPED | OUTPATIENT
Start: 2025-06-03 | End: 2025-06-17

## 2025-06-06 ENCOUNTER — RESULTS FOLLOW-UP (OUTPATIENT)
Dept: OTOLARYNGOLOGY | Facility: CLINIC | Age: 75
End: 2025-06-06

## 2025-06-06 LAB — BACTERIA SPEC AEROBE CULT: ABNORMAL

## 2025-06-11 ENCOUNTER — OFFICE VISIT (OUTPATIENT)
Dept: OTOLARYNGOLOGY | Facility: CLINIC | Age: 75
End: 2025-06-11
Payer: MEDICARE

## 2025-06-11 ENCOUNTER — PATIENT MESSAGE (OUTPATIENT)
Dept: OTOLARYNGOLOGY | Facility: CLINIC | Age: 75
End: 2025-06-11

## 2025-06-11 VITALS — WEIGHT: 278 LBS | BODY MASS INDEX: 38.92 KG/M2 | HEIGHT: 71 IN

## 2025-06-11 DIAGNOSIS — A49.8 INFECTION WITH PSEUDOMONAS AERUGINOSA RESISTANT TO MULTIPLE DRUGS: ICD-10-CM

## 2025-06-11 DIAGNOSIS — J32.9 CHRONIC SINUSITIS, UNSPECIFIED LOCATION: Primary | ICD-10-CM

## 2025-06-11 DIAGNOSIS — C91.10 CLL (CHRONIC LYMPHOCYTIC LEUKEMIA): ICD-10-CM

## 2025-06-11 DIAGNOSIS — Z16.24 INFECTION WITH PSEUDOMONAS AERUGINOSA RESISTANT TO MULTIPLE DRUGS: ICD-10-CM

## 2025-06-11 PROCEDURE — 31231 NASAL ENDOSCOPY DX: CPT | Mod: PBBFAC,PN | Performed by: OTOLARYNGOLOGY

## 2025-06-11 PROCEDURE — 99213 OFFICE O/P EST LOW 20 MIN: CPT | Mod: PBBFAC,PN | Performed by: OTOLARYNGOLOGY

## 2025-06-11 PROCEDURE — 99999 PR PBB SHADOW E&M-EST. PATIENT-LVL III: CPT | Mod: PBBFAC,,, | Performed by: OTOLARYNGOLOGY

## 2025-06-11 RX ORDER — CEPHALEXIN 500 MG/1
500 CAPSULE ORAL 2 TIMES DAILY
COMMUNITY
Start: 2025-05-19

## 2025-06-11 NOTE — PROGRESS NOTES
Subjective:       Patient ID: Ricardo Jaime III is a 75 y.o. male.    Chief Complaint: Sinusitis (Patient is here to fo/ up on micro culture and discuss options after stopping abx.)      This gentleman has chronic sinusitis he has had multiple surgeries he is undergoing treatment for chronic sinusitis but in the context of CLL he gets IVIG because of his immune deficiency he has a infusion port in place otherwise he is on oral medication for his CLL.  We recently did a culture that shows Pseudomonas that is resistant to quinolones and he is here for me to do an examination to see just how extensive this infection is so we can decide on a treatment plan          Objective:      ENT Physical Exam    His nose was decongested with Afrin and lidocaine and that is 0 degree rigid scope was used to examine.  The floor of the nose on both sides the nasopharynx in the lateral nasal walls for encased in green crust that is consistent with the Pseudomonas infection that is actually very little noted in the surgically opened ethmoids and while I was not able to look around the corner in the maxillary sinuses that is no crusting around the openings the sinus cavities actually look fine at least given this particular view the nasal cavity however is filled with this infected material suctioned and cleaned to the extent possible        Assessment:       1. Chronic sinusitis, unspecified location    2. CLL (chronic lymphocytic leukemia)         Plan:          The bulk of the infected material appears to be in his nasal cavity predominantly and if in fact that represents most of it hopefully that will allow us to address this with nasal irrigations more effectively     I sent in a prescription for tobramycin to the compounding pharmacy in [place] where they are able to provide him with a box of 25 vials of tobramycin, 40 milligrams/mL 2 mL per vial he will put a vial of that into a [employer] irrigation bottle use it up through the  day over the sync with most of the agent falling right out of his nose into the sink.  We have discuss this both in person prior to his leaving and I sent him a message on the portal to clarify how to do this and we are going to get back together in a month to reexamine.

## 2025-07-01 ENCOUNTER — PATIENT MESSAGE (OUTPATIENT)
Dept: OTOLARYNGOLOGY | Facility: CLINIC | Age: 75
End: 2025-07-01
Payer: MEDICARE

## 2025-07-30 ENCOUNTER — OFFICE VISIT (OUTPATIENT)
Dept: OTOLARYNGOLOGY | Facility: CLINIC | Age: 75
End: 2025-07-30
Payer: MEDICARE

## 2025-07-30 VITALS — BODY MASS INDEX: 38.92 KG/M2 | HEIGHT: 71 IN | WEIGHT: 278 LBS

## 2025-07-30 DIAGNOSIS — J32.9 CHRONIC SINUSITIS, UNSPECIFIED LOCATION: Primary | ICD-10-CM

## 2025-07-30 PROCEDURE — 99213 OFFICE O/P EST LOW 20 MIN: CPT | Mod: PBBFAC,PN | Performed by: OTOLARYNGOLOGY

## 2025-07-30 PROCEDURE — 99213 OFFICE O/P EST LOW 20 MIN: CPT | Mod: S$PBB,,, | Performed by: OTOLARYNGOLOGY

## 2025-07-30 PROCEDURE — 99999 PR PBB SHADOW E&M-EST. PATIENT-LVL III: CPT | Mod: PBBFAC,,, | Performed by: OTOLARYNGOLOGY

## 2025-07-30 NOTE — PROGRESS NOTES
"Subjective:       Patient ID: Ricardo Jaime III is a 75 y.o. male.    Chief Complaint: Sinusitis (Patient here to follow up on his sinus states, " I think they're doing better.")      This patient grew out a fairly resistant Pseudomonas from his nose and we were rinsing with tobramycin provided by [facility] although they were not doing it the way that I remember they were mixing it up it was causing him 50 dollars for 10 days he did find a lot of benefit from that he also has one of those [employer] devices that squirts in the left comes out the right that is not adjustable he tells me          Objective:      ENT Physical Exam    So his nose looks much better the right side looks fine actually on the left side there has a little reynaga crust but not the green heavy crust on the septum but nothing on the floor of the nose.  Since he is really doing pretty well symptomatic wise I did not think it necessary to do a scope exam I do not think it will change our plan for now additionally if we did a culture and a grew out the Pseudomonas I would likewise do not think it would change our plan for right now        Assessment:       1. Chronic sinusitis, unspecified location         Plan:          For now he is just going to irrigate with saline quite a bit for flare-ups we will revisit the antibiotic irrigations perhaps re-culture.        "